# Patient Record
Sex: MALE | Race: WHITE | URBAN - METROPOLITAN AREA
[De-identification: names, ages, dates, MRNs, and addresses within clinical notes are randomized per-mention and may not be internally consistent; named-entity substitution may affect disease eponyms.]

---

## 2024-07-25 ENCOUNTER — APPOINTMENT (RX ONLY)
Dept: URBAN - METROPOLITAN AREA CLINIC 340 | Facility: CLINIC | Age: 34
Setting detail: DERMATOLOGY
End: 2024-07-25

## 2024-07-25 DIAGNOSIS — L30.9 DERMATITIS, UNSPECIFIED: ICD-10-CM | Status: INADEQUATELY CONTROLLED

## 2024-07-25 DIAGNOSIS — L21.8 OTHER SEBORRHEIC DERMATITIS: ICD-10-CM | Status: INADEQUATELY CONTROLLED

## 2024-07-25 PROCEDURE — ? BIOPSY BY SHAVE METHOD

## 2024-07-25 PROCEDURE — ? PRESCRIPTION MEDICATION MANAGEMENT

## 2024-07-25 PROCEDURE — ? PRESCRIPTION

## 2024-07-25 PROCEDURE — ? OTHER

## 2024-07-25 PROCEDURE — ? COUNSELING

## 2024-07-25 PROCEDURE — 99204 OFFICE O/P NEW MOD 45 MIN: CPT | Mod: 25

## 2024-07-25 PROCEDURE — ? OTC TREATMENT REGIMEN

## 2024-07-25 PROCEDURE — 11102 TANGNTL BX SKIN SINGLE LES: CPT

## 2024-07-25 RX ORDER — KETOCONAZOLE 20 MG/ML
SHAMPOO, SUSPENSION TOPICAL
Qty: 120 | Refills: 6 | Status: ERX | COMMUNITY
Start: 2024-07-25

## 2024-07-25 RX ORDER — HYDROCORTISONE 25 MG/G
CREAM TOPICAL
Qty: 30 | Refills: 2 | Status: ERX | COMMUNITY
Start: 2024-07-25

## 2024-07-25 RX ORDER — PREDNISONE 5 MG/1
TABLET ORAL
Qty: 98 | Refills: 0 | Status: ERX | COMMUNITY
Start: 2024-07-25

## 2024-07-25 RX ORDER — CLOBETASOL PROPIONATE 0.5 MG/ML
SOLUTION TOPICAL
Qty: 50 | Refills: 3 | Status: ERX | COMMUNITY
Start: 2024-07-25

## 2024-07-25 RX ORDER — CLOBETASOL PROPIONATE 0.5 MG/G
CREAM TOPICAL
Qty: 60 | Refills: 2 | Status: ERX | COMMUNITY
Start: 2024-07-25

## 2024-07-25 RX ORDER — TRIAMCINOLONE ACETONIDE 1 MG/G
CREAM TOPICAL
Qty: 454 | Refills: 1 | Status: ERX | COMMUNITY
Start: 2024-07-25

## 2024-07-25 RX ADMIN — CLOBETASOL PROPIONATE: 0.5 CREAM TOPICAL at 00:00

## 2024-07-25 RX ADMIN — TRIAMCINOLONE ACETONIDE: 1 CREAM TOPICAL at 00:00

## 2024-07-25 RX ADMIN — KETOCONAZOLE: 20 SHAMPOO, SUSPENSION TOPICAL at 00:00

## 2024-07-25 RX ADMIN — HYDROCORTISONE: 25 CREAM TOPICAL at 00:00

## 2024-07-25 RX ADMIN — PREDNISONE: 5 TABLET ORAL at 00:00

## 2024-07-25 RX ADMIN — CLOBETASOL PROPIONATE: 0.5 SOLUTION TOPICAL at 00:00

## 2024-07-25 ASSESSMENT — LOCATION ZONE DERM
LOCATION ZONE: TRUNK
LOCATION ZONE: SCALP

## 2024-07-25 ASSESSMENT — LOCATION DETAILED DESCRIPTION DERM
LOCATION DETAILED: RIGHT SUPERIOR LATERAL LOWER BACK
LOCATION DETAILED: HAIR
LOCATION DETAILED: LEFT MEDIAL UPPER BACK

## 2024-07-25 ASSESSMENT — SEVERITY ASSESSMENT: SEVERITY: SEVERE

## 2024-07-25 ASSESSMENT — LOCATION SIMPLE DESCRIPTION DERM
LOCATION SIMPLE: LEFT UPPER BACK
LOCATION SIMPLE: HAIR
LOCATION SIMPLE: RIGHT LOWER BACK

## 2024-07-25 ASSESSMENT — ITCH NUMERIC RATING SCALE: HOW SEVERE IS YOUR ITCHING?: 9

## 2024-07-25 ASSESSMENT — PAIN INTENSITY VAS: HOW INTENSE IS YOUR PAIN 0 BEING NO PAIN, 10 BEING THE MOST SEVERE PAIN POSSIBLE?: 6/10 PAIN

## 2024-07-25 ASSESSMENT — BSA RASH: BSA RASH: 80

## 2024-07-25 NOTE — PROCEDURE: OTHER
Note Text (......Xxx Chief Complaint.): This diagnosis correlates with the
Detail Level: Zone
Render Risk Assessment In Note?: no
Other (Free Text): Pt states he switched to generic Vyvanse sometime in the last 3-6 mos, but he thinks the rash started AFTER that switch.  He also is taking some PO supplements, which he was encouraged to d/c for now until the rash subsides.  Suggested gentle laundry and bathing such as ALL (brand) free and clear for laundry and Dove / Aveeno for body wash.  Softsoap Aquarium Series for hand washing with lukewarm water.\\n\\nPt states he tried a Medrol gabriela from his PCP, which helped a bit while he was on it, but then the rash came back when he stopped.  To date, he has not been taking any anti-histamines or applying anything topically.  He lives with his girlfriend and cats, who are all unaffected.

## 2024-07-25 NOTE — PROCEDURE: PRESCRIPTION MEDICATION MANAGEMENT
Initiate Treatment: prednisone 5 mg tablet : Follow directions on bottle\\n\\ntriamcinolone acetonide 0.1 % topical cream : Apply twice daily to affected area of body for 2 weeks, then as needed for flares.\\n\\nclobetasol 0.05 % topical cream : Apply to affected areas of body twice daily for two weeks, then as needed for flares. Do not use on face.\\n\\nhydrocortisone 2.5 % topical cream : Apply to affected areas of the face twice daily for 2 weeks, then once daily for 1 week, then as needed
Render In Strict Bullet Format?: No
Detail Level: Zone
Initiate Treatment: ketoconazole 2 % shampoo : Lather onto Scalp every other day, Let Sit For 3-5mins Then Rinse. May Follow with regular Shampoo & Conditioner.\\n\\nclobetasol 0.05 % scalp solution : apply a few drops to scalp twice daily for two weeks, then as needed for itch and irritation

## 2024-07-25 NOTE — PROCEDURE: OTC TREATMENT REGIMEN
Detail Level: Zone
Patient Specific Otc Recommendations (Will Not Stick From Patient To Patient): Take Allegra/zyrtec twice daily for two days, then once daily for a month.\\nBenadryl spray recommended for topical relief.  Stay cool and dry.

## 2024-08-27 ENCOUNTER — APPOINTMENT (RX ONLY)
Dept: URBAN - METROPOLITAN AREA CLINIC 340 | Facility: CLINIC | Age: 34
Setting detail: DERMATOLOGY
End: 2024-08-27

## 2024-08-27 DIAGNOSIS — L21.8 OTHER SEBORRHEIC DERMATITIS: ICD-10-CM | Status: WELL CONTROLLED

## 2024-08-27 DIAGNOSIS — L259 CONTACT DERMATITIS AND OTHER ECZEMA, UNSPECIFIED CAUSE: ICD-10-CM | Status: WELL CONTROLLED

## 2024-08-27 PROBLEM — L30.8 OTHER SPECIFIED DERMATITIS: Status: ACTIVE | Noted: 2024-08-27

## 2024-08-27 PROCEDURE — ? PATHOLOGY DISCUSSION

## 2024-08-27 PROCEDURE — ? OTC TREATMENT REGIMEN

## 2024-08-27 PROCEDURE — ? PRESCRIPTION MEDICATION MANAGEMENT

## 2024-08-27 PROCEDURE — ? COUNSELING

## 2024-08-27 PROCEDURE — 99213 OFFICE O/P EST LOW 20 MIN: CPT

## 2024-08-27 ASSESSMENT — LOCATION DETAILED DESCRIPTION DERM
LOCATION DETAILED: POSTERIOR MID-PARIETAL SCALP
LOCATION DETAILED: RIGHT SUPERIOR LATERAL MIDBACK

## 2024-08-27 ASSESSMENT — LOCATION ZONE DERM
LOCATION ZONE: TRUNK
LOCATION ZONE: SCALP

## 2024-08-27 ASSESSMENT — LOCATION SIMPLE DESCRIPTION DERM
LOCATION SIMPLE: POSTERIOR SCALP
LOCATION SIMPLE: RIGHT LOWER BACK

## 2024-08-27 ASSESSMENT — SEVERITY ASSESSMENT 2020: SEVERITY 2020: ALMOST CLEAR

## 2024-08-27 NOTE — PROCEDURE: PRESCRIPTION MEDICATION MANAGEMENT
Detail Level: Zone
Render In Strict Bullet Format?: No
Continue Regimen: Triamcinalone- continue applying to affected areas of the body twice daily for 2 weeks and then as needed for flares\\n\\nClobetosol- apply to hot spots as needed for flares. Do not apply to face and use sparingly.
Modify Regimen: Hydrocortisone cream- use as needed for flares. Up to twice daily for 2 weeks and then just for flares
Plan: Per CE pt can call for refills for up to a year of the hydrocortisone, triamcinalone and clobetosol cream and scalp solution
Continue Regimen: Clobetosol scalp solution- apply as needed for flares